# Patient Record
Sex: MALE | Race: BLACK OR AFRICAN AMERICAN | Employment: FULL TIME | URBAN - METROPOLITAN AREA
[De-identification: names, ages, dates, MRNs, and addresses within clinical notes are randomized per-mention and may not be internally consistent; named-entity substitution may affect disease eponyms.]

---

## 2018-08-02 ENCOUNTER — HOSPITAL ENCOUNTER (EMERGENCY)
Facility: CLINIC | Age: 33
Discharge: HOME | End: 2018-08-02
Attending: EMERGENCY MEDICINE
Payer: COMMERCIAL

## 2018-08-02 ENCOUNTER — APPOINTMENT (OUTPATIENT)
Dept: GENERAL RADIOLOGY | Facility: CLINIC | Age: 33
End: 2018-08-02
Payer: COMMERCIAL

## 2018-08-02 VITALS
RESPIRATION RATE: 16 BRPM | TEMPERATURE: 98.4 F | SYSTOLIC BLOOD PRESSURE: 138 MMHG | OXYGEN SATURATION: 97 % | DIASTOLIC BLOOD PRESSURE: 67 MMHG | HEIGHT: 68 IN | BODY MASS INDEX: 27.28 KG/M2 | HEART RATE: 79 BPM | WEIGHT: 180 LBS

## 2018-08-02 DIAGNOSIS — S83.91XA SPRAIN OF RIGHT LOWER LEG, INITIAL ENCOUNTER: Primary | ICD-10-CM

## 2018-08-02 PROCEDURE — 73590 X-RAY EXAM OF LOWER LEG: CPT

## 2018-08-02 PROCEDURE — 6370000000 HC RX 637 (ALT 250 FOR IP): Performed by: EMERGENCY MEDICINE

## 2018-08-02 PROCEDURE — 99283 EMERGENCY DEPT VISIT LOW MDM: CPT

## 2018-08-02 RX ORDER — IBUPROFEN 600 MG/1
600 TABLET ORAL ONCE
Status: COMPLETED | OUTPATIENT
Start: 2018-08-02 | End: 2018-08-02

## 2018-08-02 RX ADMIN — IBUPROFEN 600 MG: 600 TABLET ORAL at 16:23

## 2018-08-02 ASSESSMENT — PAIN DESCRIPTION - ORIENTATION: ORIENTATION: RIGHT

## 2018-08-02 ASSESSMENT — PAIN DESCRIPTION - PAIN TYPE: TYPE: ACUTE PAIN

## 2018-08-02 ASSESSMENT — PAIN SCALES - GENERAL
PAINLEVEL_OUTOF10: 2
PAINLEVEL_OUTOF10: 5
PAINLEVEL_OUTOF10: 2

## 2018-08-02 ASSESSMENT — PAIN DESCRIPTION - DESCRIPTORS: DESCRIPTORS: ACHING

## 2018-08-02 NOTE — ED PROVIDER NOTES
injury  TECHNOLOGIST PROVIDED HISTORY:  Reason for exam:->injury  Ordering Physician Provided Reason for Exam: pivoted his leg at work and felt  pain shoot down his lower leg. Acuity: Acute  Type of Exam: Initial    FINDINGS:  Mineralization and alignment are satisfactory.  No acute fracture,  dislocation, or localized soft tissue swelling.  No radiopaque foreign body.                        EMERGENCY DEPARTMENT COURSE:   Vitals:    Vitals:    08/02/18 1551   BP: 128/70   Pulse: 75   Resp: 15   Temp: 98.4 °F (36.9 °C)   TempSrc: Oral   Weight: 81.6 kg (180 lb)   Height: 5' 8\" (1.727 m)     -------------------------  BP: 128/70, Temp: 98.4 °F (36.9 °C), Pulse: 75, Resp: 15      Re-evaluation Notes    The patient would like a splint. This was provided. He declines medicine for pain or a work note. He is advised to ice and elevate. I see no evidence of acute fracture or compartment syndrome. He may follow-up with his own doctor. He is discharged in good condition. PROCEDURES:    An air cast was applied by the nurse. The patient good color, sensation, motion of the toes after placement. FINAL IMPRESSION      1.  Sprain of right lower leg, initial encounter          DISPOSITION/PLAN   DISPOSITION Discharge - Pending Orders Complete 08/02/2018 04:34:09 PM      Condition on Disposition    good    PATIENT REFERRED TO:  your doctor    In 1 week        DISCHARGE MEDICATIONS:  New Prescriptions    No medications on file       (Please note that portions of this note were completed with a voice recognition program.  Efforts were made to edit the dictations but occasionally words are mis-transcribed.)    Powers MD   Attending Emergency Physician       Tr Roman MD  08/02/18 2417

## 2019-08-11 ENCOUNTER — HOSPITAL ENCOUNTER (EMERGENCY)
Age: 34
Discharge: HOME | End: 2019-08-11
Attending: EMERGENCY MEDICINE

## 2019-08-11 VITALS
SYSTOLIC BLOOD PRESSURE: 142 MMHG | TEMPERATURE: 98.5 F | OXYGEN SATURATION: 100 % | WEIGHT: 170.3 LBS | DIASTOLIC BLOOD PRESSURE: 89 MMHG | HEART RATE: 74 BPM | BODY MASS INDEX: 25.22 KG/M2 | HEIGHT: 69 IN | RESPIRATION RATE: 16 BRPM

## 2019-08-11 DIAGNOSIS — N34.2 URETHRITIS: Primary | ICD-10-CM

## 2019-08-11 PROCEDURE — 81003 URINALYSIS AUTO W/O SCOPE: CPT

## 2019-08-11 PROCEDURE — 87591 N.GONORRHOEAE DNA AMP PROB: CPT

## 2019-08-11 PROCEDURE — 87491 CHLMYD TRACH DNA AMP PROBE: CPT

## 2019-08-11 PROCEDURE — 96372 THER/PROPH/DIAG INJ SC/IM: CPT

## 2019-08-11 PROCEDURE — 6360000002 HC RX W HCPCS: Performed by: EMERGENCY MEDICINE

## 2019-08-11 PROCEDURE — 99283 EMERGENCY DEPT VISIT LOW MDM: CPT

## 2019-08-11 PROCEDURE — 6370000000 HC RX 637 (ALT 250 FOR IP): Performed by: EMERGENCY MEDICINE

## 2019-08-11 RX ORDER — CEFTRIAXONE SODIUM 250 MG/1
250 INJECTION, POWDER, FOR SOLUTION INTRAMUSCULAR; INTRAVENOUS ONCE
Status: COMPLETED | OUTPATIENT
Start: 2019-08-11 | End: 2019-08-11

## 2019-08-11 RX ORDER — AZITHROMYCIN 250 MG/1
1000 TABLET, FILM COATED ORAL ONCE
Status: COMPLETED | OUTPATIENT
Start: 2019-08-11 | End: 2019-08-11

## 2019-08-11 RX ADMIN — AZITHROMYCIN MONOHYDRATE 1000 MG: 250 TABLET ORAL at 11:20

## 2019-08-11 RX ADMIN — CEFTRIAXONE SODIUM 250 MG: 250 INJECTION, POWDER, FOR SOLUTION INTRAMUSCULAR; INTRAVENOUS at 11:20

## 2019-08-11 ASSESSMENT — ENCOUNTER SYMPTOMS
FACIAL SWELLING: 0
DIARRHEA: 0
SHORTNESS OF BREATH: 0
EYE DISCHARGE: 0
COLOR CHANGE: 0
EYE REDNESS: 0
COUGH: 0
CONSTIPATION: 0
VOMITING: 0
ABDOMINAL PAIN: 0

## 2019-08-11 NOTE — ED PROVIDER NOTES
and vomiting. Genitourinary: Positive for discharge. Negative for dysuria, genital sores and hematuria. Musculoskeletal: Negative for arthralgias. Skin: Negative for color change and rash. Neurological: Negative for syncope, numbness and headaches. Hematological: Negative for adenopathy. Psychiatric/Behavioral: Negative for confusion. The patient is not nervous/anxious. Except as noted above the remainder of the review of systems was reviewed and negative. PHYSICAL EXAM    (up to 7 for level 4, 8 or more for level 5)     Vitals:    08/11/19 1058   BP: (!) 142/89   Pulse: 74   Resp: 16   Temp: 98.5 °F (36.9 °C)   TempSrc: Oral   SpO2: 100%   Weight: 170 lb 4.8 oz (77.2 kg)   Height: 5' 9\" (1.753 m)       Physical Exam   Constitutional: He is oriented to person, place, and time. He appears well-developed and well-nourished. No distress. HENT:   Head: Normocephalic and atraumatic. Eyes: Right eye exhibits no discharge. Left eye exhibits no discharge. No scleral icterus. Neck: Neck supple. Cardiovascular: Normal rate and regular rhythm. Pulmonary/Chest: Effort normal and breath sounds normal. No stridor. No respiratory distress. He has no wheezes. He has no rales. Abdominal: Soft. He exhibits no distension. There is no tenderness. Musculoskeletal: Normal range of motion. Lymphadenopathy:     He has no cervical adenopathy. Neurological: He is alert and oriented to person, place, and time. Skin: Skin is warm and dry. No rash noted. He is not diaphoretic. No erythema. Psychiatric: He has a normal mood and affect. His behavior is normal.   Vitals reviewed.           DIAGNOSTIC RESULTS     EKG: All EKG's are interpreted by the Emergency Department Physician who either signs or Co-signs this chart in the absence of a cardiologist.    Not indicated    RADIOLOGY:   Non-plain film images such as CT, Ultrasound and MRI are read by the radiologist. Plain radiographic images are visualized and preliminarily interpreted by the emergency physician with the below findings:    Not indicated    Interpretation per the Radiologist below, if available at the time of this note:        LABS:  Labs Reviewed   C.TRACHOMATIS N.GONORRHOEAE DNA, URINE       All other labs were within normal range or not returned as of this dictation. EMERGENCY DEPARTMENT COURSE and DIFFERENTIAL DIAGNOSIS/MDM:   Vitals:    Vitals:    08/11/19 1058   BP: (!) 142/89   Pulse: 74   Resp: 16   Temp: 98.5 °F (36.9 °C)   TempSrc: Oral   SpO2: 100%   Weight: 170 lb 4.8 oz (77.2 kg)   Height: 5' 9\" (1.753 m)       Orders Placed This Encounter   Medications    cefTRIAXone (ROCEPHIN) injection 250 mg    azithromycin (ZITHROMAX) tablet 1,000 mg       Medical Decision Making: My clinical impression is that he has urethritis. He was treated with Rocephin and Zithromax. Treatment diagnosis and follow-up were discussed with the patient. CONSULTS:  None    PROCEDURES:  None    FINAL IMPRESSION      1.  Urethritis          DISPOSITION/PLAN   DISPOSITION Decision To Discharge 08/11/2019 11:11:09 AM      PATIENT REFERRED TO:   [facility] ED  [address] [facility]  [phone]    If symptoms worsen      DISCHARGE MEDICATIONS:     New Prescriptions    No medications on file         (Please note that portions of this note were completed with a voice recognition program.  Efforts were made to edit the dictations but occasionally words are mis-transcribed.)    Kam Gutierrez MD  Attending Emergency Physician           Kam Gutierrez MD  08/11/19 8856

## 2019-08-12 LAB
C. TRACHOMATIS DNA ,URINE: NEGATIVE
N. GONORRHOEAE DNA, URINE: ABNORMAL
SPECIMEN DESCRIPTION: ABNORMAL

## 2020-07-28 ENCOUNTER — APPOINTMENT (OUTPATIENT)
Dept: GENERAL RADIOLOGY | Age: 35
End: 2020-07-28
Payer: COMMERCIAL

## 2020-07-28 ENCOUNTER — HOSPITAL ENCOUNTER (EMERGENCY)
Age: 35
Discharge: HOME | End: 2020-07-28
Attending: EMERGENCY MEDICINE
Payer: COMMERCIAL

## 2020-07-28 VITALS
SYSTOLIC BLOOD PRESSURE: 147 MMHG | HEART RATE: 70 BPM | OXYGEN SATURATION: 100 % | TEMPERATURE: 98.1 F | DIASTOLIC BLOOD PRESSURE: 77 MMHG | RESPIRATION RATE: 14 BRPM

## 2020-07-28 LAB
ABSOLUTE EOS #: 0.37 K/UL (ref 0–0.44)
ABSOLUTE IMMATURE GRANULOCYTE: 0.04 K/UL (ref 0–0.3)
ABSOLUTE LYMPH #: 2.13 K/UL (ref 1.1–3.7)
ABSOLUTE MONO #: 0.59 K/UL (ref 0.1–1.2)
ANION GAP SERPL CALCULATED.3IONS-SCNC: 11 MMOL/L (ref 9–17)
BASOPHILS # BLD: 0 % (ref 0–2)
BASOPHILS ABSOLUTE: 0.03 K/UL (ref 0–0.2)
BUN BLDV-MCNC: 17 MG/DL (ref 6–20)
BUN/CREAT BLD: 20 (ref 9–20)
CALCIUM SERPL-MCNC: 10.6 MG/DL (ref 8.6–10.4)
CHLORIDE BLD-SCNC: 102 MMOL/L (ref 98–107)
CO2: 26 MMOL/L (ref 20–31)
CREAT SERPL-MCNC: 0.83 MG/DL (ref 0.7–1.2)
DIFFERENTIAL TYPE: ABNORMAL
EOSINOPHILS RELATIVE PERCENT: 5 % (ref 1–4)
GFR AFRICAN AMERICAN: >60 ML/MIN
GFR NON-AFRICAN AMERICAN: >60 ML/MIN
GFR SERPL CREATININE-BSD FRML MDRD: ABNORMAL ML/MIN/{1.73_M2}
GFR SERPL CREATININE-BSD FRML MDRD: ABNORMAL ML/MIN/{1.73_M2}
GLUCOSE BLD-MCNC: 97 MG/DL (ref 70–99)
HCT VFR BLD CALC: 42.1 % (ref 40.7–50.3)
HEMOGLOBIN: 13.7 G/DL (ref 13–17)
IMMATURE GRANULOCYTES: 1 %
LYMPHOCYTES # BLD: 29 % (ref 24–43)
MCH RBC QN AUTO: 31.4 PG (ref 25.2–33.5)
MCHC RBC AUTO-ENTMCNC: 32.5 G/DL (ref 28.4–34.8)
MCV RBC AUTO: 96.3 FL (ref 82.6–102.9)
MONOCYTES # BLD: 8 % (ref 3–12)
MYOGLOBIN: 27 NG/ML (ref 28–72)
MYOGLOBIN: 31 NG/ML (ref 28–72)
NRBC AUTOMATED: 0 PER 100 WBC
PDW BLD-RTO: 13.3 % (ref 11.8–14.4)
PLATELET # BLD: 231 K/UL (ref 138–453)
PLATELET ESTIMATE: ABNORMAL
PMV BLD AUTO: 10.4 FL (ref 8.1–13.5)
POTASSIUM SERPL-SCNC: 3.9 MMOL/L (ref 3.7–5.3)
RBC # BLD: 4.37 M/UL (ref 4.21–5.77)
RBC # BLD: ABNORMAL 10*6/UL
SEG NEUTROPHILS: 57 % (ref 36–65)
SEGMENTED NEUTROPHILS ABSOLUTE COUNT: 4.26 K/UL (ref 1.5–8.1)
SODIUM BLD-SCNC: 139 MMOL/L (ref 135–144)
TROPONIN INTERP: ABNORMAL
TROPONIN INTERP: NORMAL
TROPONIN T: ABNORMAL NG/ML
TROPONIN T: NORMAL NG/ML
TROPONIN, HIGH SENSITIVITY: <6 NG/L (ref 0–22)
TROPONIN, HIGH SENSITIVITY: <6 NG/L (ref 0–22)
WBC # BLD: 7.4 K/UL (ref 3.5–11.3)
WBC # BLD: ABNORMAL 10*3/UL

## 2020-07-28 PROCEDURE — 83874 ASSAY OF MYOGLOBIN: CPT

## 2020-07-28 PROCEDURE — 85025 COMPLETE CBC W/AUTO DIFF WBC: CPT

## 2020-07-28 PROCEDURE — 84484 ASSAY OF TROPONIN QUANT: CPT

## 2020-07-28 PROCEDURE — 6360000002 HC RX W HCPCS: Performed by: NURSE PRACTITIONER

## 2020-07-28 PROCEDURE — 93005 ELECTROCARDIOGRAM TRACING: CPT | Performed by: NURSE PRACTITIONER

## 2020-07-28 PROCEDURE — 73030 X-RAY EXAM OF SHOULDER: CPT

## 2020-07-28 PROCEDURE — 6370000000 HC RX 637 (ALT 250 FOR IP): Performed by: NURSE PRACTITIONER

## 2020-07-28 PROCEDURE — 80048 BASIC METABOLIC PNL TOTAL CA: CPT

## 2020-07-28 PROCEDURE — 99285 EMERGENCY DEPT VISIT HI MDM: CPT

## 2020-07-28 PROCEDURE — 96372 THER/PROPH/DIAG INJ SC/IM: CPT

## 2020-07-28 PROCEDURE — 71045 X-RAY EXAM CHEST 1 VIEW: CPT

## 2020-07-28 RX ORDER — KETOROLAC TROMETHAMINE 30 MG/ML
30 INJECTION, SOLUTION INTRAMUSCULAR; INTRAVENOUS ONCE
Status: COMPLETED | OUTPATIENT
Start: 2020-07-28 | End: 2020-07-28

## 2020-07-28 RX ORDER — ASPIRIN 81 MG/1
324 TABLET, CHEWABLE ORAL ONCE
Status: COMPLETED | OUTPATIENT
Start: 2020-07-28 | End: 2020-07-28

## 2020-07-28 RX ORDER — IBUPROFEN 800 MG/1
800 TABLET ORAL EVERY 8 HOURS PRN
Qty: 30 TABLET | Refills: 0 | Status: SHIPPED | OUTPATIENT
Start: 2020-07-28 | End: 2022-01-14

## 2020-07-28 RX ADMIN — ASPIRIN 81 MG 324 MG: 81 TABLET ORAL at 18:14

## 2020-07-28 RX ADMIN — KETOROLAC TROMETHAMINE 30 MG: 30 INJECTION, SOLUTION INTRAMUSCULAR at 18:01

## 2020-07-28 ASSESSMENT — PAIN DESCRIPTION - PAIN TYPE: TYPE: ACUTE PAIN

## 2020-07-28 ASSESSMENT — ENCOUNTER SYMPTOMS
VOMITING: 0
COLOR CHANGE: 0
CHEST TIGHTNESS: 0
COUGH: 0
BACK PAIN: 0
NAUSEA: 0
SINUS PRESSURE: 0
SHORTNESS OF BREATH: 0

## 2020-07-28 ASSESSMENT — PAIN DESCRIPTION - ONSET: ONSET: ON-GOING

## 2020-07-28 ASSESSMENT — PAIN DESCRIPTION - DESCRIPTORS: DESCRIPTORS: SHARP

## 2020-07-28 ASSESSMENT — PAIN DESCRIPTION - PROGRESSION: CLINICAL_PROGRESSION: RAPIDLY IMPROVING

## 2020-07-28 ASSESSMENT — PAIN DESCRIPTION - LOCATION: LOCATION: CHEST;SHOULDER

## 2020-07-28 ASSESSMENT — PAIN DESCRIPTION - FREQUENCY: FREQUENCY: INTERMITTENT

## 2020-07-28 ASSESSMENT — PAIN SCALES - GENERAL
PAINLEVEL_OUTOF10: 6
PAINLEVEL_OUTOF10: 1

## 2020-07-28 ASSESSMENT — PAIN DESCRIPTION - ORIENTATION: ORIENTATION: LEFT

## 2020-07-28 NOTE — ED PROVIDER NOTES
[address] [place] [address] ED  EMERGENCY DEPARTMENT ENCOUNTER      Pt Name: Nirali Pearson  MRN: [id number]  [place] 1985  Date of evaluation: 7/28/20  CHIEF COMPLAINT       Chief Complaint   Patient presents with    Chest Pain    Shoulder Pain     Left     HISTORY OF PRESENT ILLNESS   Presents to the emergency room via private auto with 5 month history of left shoulder pain and 1 week history of chest pain. Pain has been constant and is slightly worse with movement. No injury or trauma. No associated dizziness, diaphoresis or shortness of breath. He his a smoker. No history of CAD or clotting disorder. No history of CAD in his immediate family. Motrin has been ineffective. The history is provided by the patient. REVIEW OF SYSTEMS     Review of Systems   Constitutional: Negative for activity change, diaphoresis and fever. HENT: Negative for congestion and sinus pressure. Respiratory: Negative for cough, chest tightness and shortness of breath. Cardiovascular: Positive for chest pain. Negative for palpitations and leg swelling. Gastrointestinal: Negative for nausea and vomiting. Musculoskeletal: Positive for arthralgias. Negative for back pain and myalgias. Skin: Negative for color change and rash. Neurological: Negative for dizziness and headaches. Psychiatric/Behavioral: Negative for confusion and decreased concentration. PASTMEDICAL HISTORY   History reviewed. No pertinent past medical history. SURGICAL HISTORY     History reviewed. No pertinent surgical history. CURRENT MEDICATIONS       Discharge Medication List as of 7/28/2020  8:41 PM        ALLERGIES     has No Known Allergies. FAMILY HISTORY     has no family status information on file.       SOCIAL HISTORY       Social History     Tobacco Use    Smoking status: Current Every Day Smoker     Types: Cigarettes    Smokeless tobacco: Never Used   Substance Use Topics    Alcohol use: Yes     Comment: socially    Drug use: Never PHYSICAL EXAM     INITIAL VITALS: BP (!) 147/77   Pulse 70   Temp 98.1 °F (36.7 °C)   Resp 14   SpO2 100%    Physical Exam  Constitutional:       Appearance: Normal appearance. HENT:      Right Ear: External ear normal.      Left Ear: External ear normal.      Mouth/Throat:      Mouth: Mucous membranes are moist.      Pharynx: Oropharynx is clear. Eyes:      Extraocular Movements: Extraocular movements intact. Conjunctiva/sclera: Conjunctivae normal.      Pupils: Pupils are equal, round, and reactive to light. Cardiovascular:      Rate and Rhythm: Normal rate and regular rhythm. Pulmonary:      Effort: Pulmonary effort is normal.      Breath sounds: Normal breath sounds. Abdominal:      Palpations: Abdomen is soft. Tenderness: There is no abdominal tenderness. Musculoskeletal: Normal range of motion. General: No tenderness or deformity. Skin:     General: Skin is warm and dry. Findings: No bruising or erythema. Neurological:      General: No focal deficit present. Mental Status: He is alert and oriented to person, place, and time. Psychiatric:         Mood and Affect: Mood normal.         Thought Content: Thought content normal.         DIAGNOSTIC RESULTS     RADIOLOGY:All plain film, CT, MRI, and formal ultrasound images (except ED bedside ultrasound) are read by the radiologist, see reports below, unless otherwise noted in MDM or here. Interpretation per the Radiologist below, if available at the time of this note:    XR CHEST PORTABLE   Final Result   Low lung volumes with minimal basilar atelectasis. XR SHOULDER LEFT (MIN 2 VIEWS)   Preliminary Result   No evidence of acute osseous abnormality involving the left shoulder.              LABS:  Labs Reviewed   CBC WITH AUTO DIFFERENTIAL - Abnormal; Notable for the following components:       Result Value    Eosinophils % 5 (*)     Immature Granulocytes 1 (*)     All other components within normal limits   BASIC METABOLIC PANEL - Abnormal; Notable for the following components:    Calcium 10.6 (*)     All other components within normal limits   TROP/MYOGLOBIN - Abnormal; Notable for the following components:    Myoglobin 27 (*)     All other components within normal limits   TROP/MYOGLOBIN       All other labs were within normal range or not returned as of this dictation. EMERGENCY DEPARTMENT COURSE and DIFFERENTIAL DIAGNOSIS/MDM:   Vitals:    Vitals:    20 1708   BP: (!) 147/77   Pulse: 70   Resp: 14   Temp: 98.1 °F (36.7 °C)   SpO2: 100%       Medical Decision Makinyear-old with chest pain. EKG was interpreted by Dr. Zach Ellsworth. Trop is negative times 1. Care was turned over to Dr. Zach Ellsworth at the end of my shift for further evaluation, orders, diagnosis and disposition. ED Course as of 1048   Tue 2020   1941 Resting comfortably. No complaints. Second trop due at 8pm    [EL]      ED Course User Index  [EL] ZURDO Vaughan - CNP       CONSULTS:  IP CONSULT TO CARDIOLOGY    PROCEDURES:  None    The patient was given the following meds in the ED:  Orders Placed This Encounter   Medications    ketorolac (TORADOL) injection 30 mg    aspirin chewable tablet 324 mg    ibuprofen (ADVIL;MOTRIN) 800 MG tablet     Sig: Take 1 tablet by mouth every 8 hours as needed for Pain     Dispense:  30 tablet     Refill:  0       FINAL IMPRESSION      1. Chest pain, unspecified type    2.  Acute pain of left shoulder          DISPOSITION/PLAN   DISPOSITION  Discharged      PATIENT REFERRED TO:   [facility] ED  [address] [facility]  [phone]    If symptoms worsen    [facility]  [phone]  Schedule an appointment as soon as possible for a visit         DISCHARGE MEDICATIONS:     Discharge Medication List as of 2020  8:41 PM          CRITICAL CARE TIME       Please note that portions of this note were completed with a voice recognition program.      ZURDO Robin - SESAR Day, ZURDO - CNP  07/28/20 1944       Velma Day, ZURDO - CNP  07/30/20 1047

## 2020-07-28 NOTE — ED NOTES
Pt ambulatory to room 6 with c/o ongoing left shoulder pain x 2-3 months. Pt denies any injuries. Pt reports he has not seen his PCP for this c/o, states \"I didn't want to come in with all the COVID stuff going on\". No obvious deformities, ecchymosis, or edema noted to left shoulder. Full ROM intact. Radial pulse WNL. Pt also c/o left sided CP x 1 week. Pt reports taking PO Antacid with mild relief. Pt reports he was just sitting down when CP began. Pt reports pain is aggravated by movement but no alleviating factors. Pt denies any SOB, cough, dizziness, N/V, or sweats/chills/fevers. Respirations even and non labored. NAD noted.       Keila Singh RN  07/28/20 2950

## 2020-07-28 NOTE — ED PROVIDER NOTES
[address]     Pt Name: Angelica Phillip  MRN: [id number]  [place] 1985  Date of evaluation: 7/28/20   Angelica Phillip is a 27 y.o. male with CC: Chest Pain and Shoulder Pain (Left)    MDM:   Patient is a 76-year-old male here with left-sided chest pain described as sharp and left shoulder pain. He is had the shoulder pain for several weeks, the chest pain for few days. States intermittent, no associated shortness of breath diaphoresis nausea vomiting. Denies fevers or chills. Denies history of PE DVT recent surgeries leg swelling hemoptysis or prolonged immobilization. Denies crack cocaine or heroin use. Denies personal cardiac history or any syncope or lightheadedness dizziness. On exam resting comfortably in bed, appears well no distress neck is supple heart sounds regular lungs clear equal radial pulses abdomen soft nontender lower extremities no calf tenderness or asymmetry. Impression is chest pain, will check a troponin, patient is PE RC negative doubt PE, EKG and chest x-ray as well. 6:10 PM  EKG interpreted as STEMI by computer, patient appears very well, likely benign early repolarization although EKG has been emailed to the on-call cardiologist for recommendations. Spoke with Dr. Fritz Cronin who thinks this is likely benign early repolarization, if 2- troponins can be discharged with outpatient follow-up. 8:18 PM EDT  Repeat EKG  EKG Interpretation    Interpreted by me    Rhythm: normal sinus   Rate: normal  Axis: normal  Ectopy: none  Conduction: Incomplete right bundle branch block  ST Segments: Mild elevation in lead I, aVL, V2 through V6  T Waves: no acute change  Q Waves: none    Clinical Impression: Abnormal EKG, appears similar to prior no reciprocal changes to suggest ischemia    ED Course as of Jul 28 2019   Tue Jul 28, 2020   1941 Resting comfortably. No complaints.  Second trop due at 8pm    [EL]      ED Course User Index  [EL] Angelika Mejia KIMBERLY Ervin, APRN - SESAR       CRITICAL CARE:       EKG: All EKG's are interpreted by the Emergency Department Physician who either signs or Co-signs this chart in the absence of a cardiologist.    EKG Interpretation    Interpreted by me    Rhythm: normal sinus   Rate: normal  Axis: normal  Ectopy: none  Conduction: Incomplete right bundle branch block   ST Segments: Elevation seen in lead I, aVL, V2, V3, V4, V5 V6  T Waves: no acute change  Q Waves: none    Clinical Impression: Abnormal EKG, elevation seen in several leads, do not see any reciprocal changes, consider ischemia, none prior for comparison, consider benign early repolarization or pericarditis      RADIOLOGY:All plain film, CT, MRI, and formal ultrasound images (except ED bedside ultrasound) are read by the radiologist, see reports below, unless otherwise noted in MDM or here. XR CHEST PORTABLE   Final Result   Low lung volumes with minimal basilar atelectasis. XR SHOULDER LEFT (MIN 2 VIEWS)   Preliminary Result   No evidence of acute osseous abnormality involving the left shoulder. LABS: All lab results were reviewed by myself, and all abnormals are listed below. Labs Reviewed   CBC WITH AUTO DIFFERENTIAL - Abnormal; Notable for the following components:       Result Value    Eosinophils % 5 (*)     Immature Granulocytes 1 (*)     All other components within normal limits   BASIC METABOLIC PANEL - Abnormal; Notable for the following components:    Calcium 10.6 (*)     All other components within normal limits   TROP/MYOGLOBIN - Abnormal; Notable for the following components:    Myoglobin 27 (*)     All other components within normal limits   TROP/MYOGLOBIN     CONSULTS:  IP CONSULT TO CARDIOLOGY  FINAL IMPRESSION      1. Chest pain, unspecified type            PASTMEDICAL HISTORY   History reviewed. No pertinent past medical history. SURGICAL HISTORY     History reviewed. No pertinent surgical history.   CURRENT MEDICATIONS Previous Medications    No medications on file     ALLERGIES     has No Known Allergies. FAMILY HISTORY     has no family status information on file. SOCIAL HISTORY       Social History     Tobacco Use    Smoking status: Current Every Day Smoker     Types: Cigarettes    Smokeless tobacco: Never Used   Substance Use Topics    Alcohol use: Yes     Comment: socially    Drug use: Never       I personally evaluated and examined the patient in conjunction with the APC and agree with the assessment, treatment plan, and disposition of the patient as recorded by the APC.    Brion Kayser, MD  Attending Emergency Physician          Brion Kayser, MD  07/28/20 3343

## 2020-07-29 LAB
EKG ATRIAL RATE: 63 BPM
EKG ATRIAL RATE: 66 BPM
EKG P AXIS: -5 DEGREES
EKG P AXIS: 66 DEGREES
EKG P-R INTERVAL: 170 MS
EKG P-R INTERVAL: 182 MS
EKG Q-T INTERVAL: 368 MS
EKG Q-T INTERVAL: 372 MS
EKG QRS DURATION: 102 MS
EKG QRS DURATION: 108 MS
EKG QTC CALCULATION (BAZETT): 376 MS
EKG QTC CALCULATION (BAZETT): 389 MS
EKG R AXIS: 19 DEGREES
EKG R AXIS: 23 DEGREES
EKG T AXIS: 30 DEGREES
EKG T AXIS: 41 DEGREES
EKG VENTRICULAR RATE: 63 BPM
EKG VENTRICULAR RATE: 66 BPM

## 2022-01-14 ENCOUNTER — APPOINTMENT (OUTPATIENT)
Dept: GENERAL RADIOLOGY | Facility: CLINIC | Age: 37
End: 2022-01-14
Payer: COMMERCIAL

## 2022-01-14 ENCOUNTER — HOSPITAL ENCOUNTER (EMERGENCY)
Facility: CLINIC | Age: 37
Discharge: HOME | End: 2022-01-14
Attending: EMERGENCY MEDICINE
Payer: COMMERCIAL

## 2022-01-14 VITALS
WEIGHT: 193 LBS | RESPIRATION RATE: 17 BRPM | OXYGEN SATURATION: 98 % | HEIGHT: 68 IN | TEMPERATURE: 98.1 F | BODY MASS INDEX: 29.25 KG/M2 | SYSTOLIC BLOOD PRESSURE: 124 MMHG | DIASTOLIC BLOOD PRESSURE: 64 MMHG | HEART RATE: 82 BPM

## 2022-01-14 DIAGNOSIS — M79.604 RIGHT LEG PAIN: Primary | ICD-10-CM

## 2022-01-14 PROCEDURE — 73590 X-RAY EXAM OF LOWER LEG: CPT

## 2022-01-14 PROCEDURE — 99283 EMERGENCY DEPT VISIT LOW MDM: CPT

## 2022-01-14 RX ORDER — IBUPROFEN 800 MG/1
800 TABLET ORAL 3 TIMES DAILY PRN
Qty: 30 TABLET | Refills: 0 | Status: SHIPPED | OUTPATIENT
Start: 2022-01-14

## 2022-01-14 NOTE — ED PROVIDER NOTES
[facility] ED  [address] [facility]  Phone: [phone]      Attending Physician [address]       Chief Complaint   Patient presents with    Leg Pain     right leg ,calf pain       DIAGNOSTIC RESULTS     LABS:  Labs Reviewed - No data to display    All other labs were within normal range or not returned as of this dictation. RADIOLOGY:  XR TIBIA FIBULA RIGHT (2 VIEWS)   Final Result   No acute abnormality right tibia or fibula. EMERGENCY DEPARTMENT COURSE:   Vitals:    Vitals:    01/14/22 1035 01/14/22 1036 01/14/22 1037   BP:  124/64    Pulse:  82    Resp:   17   Temp:   98.1 °F (36.7 °C)   SpO2:   98%   Weight: 87.5 kg (193 lb)     Height: 5' 8\" (1.727 m)       -------------------------  BP: 124/64, Temp: 98.1 °F (36.7 °C), Pulse: 82, Resp: 17             PERTINENT ATTENDING PHYSICIAN COMMENTS:    I performed a history and physical examination of the patient and discussed management with the mid level provider. I reviewed the mid level provider's note and agree with the documented findings and plan of care. Any areas of disagreement are noted on the chart. I was personally present for the key portions of any procedures. I have documented in the chart those procedures where I was not present during the key portions. I have reviewed the emergency nurses triage note. I agree with the chief complaint, past medical history, past surgical history, allergies, medications, social and family history as documented unless otherwise noted below. Documentation of the HPI, Physical Exam and Medical Decision Making performed by mid level providers is based on my personal performance of the HPI, PE and MDM. For Physician Assistant/ Nurse Practitioner cases/documentation I have personally evaluated this patient and have completed at least one if not all key elements of the E/M (history, physical exam, and MDM).  Additional findings are as

## 2022-01-14 NOTE — ED PROVIDER NOTES
[facility]  [address] [facility]  Phone: [phone]      eMERGENCY dEPARTMENT eNCOUnter      Pt Name: Dorise Holter  MRN: [id number]  [place] 1985  Date of evaluation: 1/14/22      CHIEF COMPLAINT:  Chief Complaint   Patient presents with    Leg Pain     right leg ,calf pain     HISTORY OF PRESENT ILLNESS    Dorise Holter is a 39 y.o. male who presents with evaluation for right lower leg pain. He states he was walking down the stairs at work 2 days ago when he felt a sharp pain in the right side of his lower leg. He states he continues to feel the pain and has had a previous \"sprain\" to the same area in the past that he was evaluated for here at this facility in 2018. He denies radiation of pain to his ankle, knee, hip or foot. He has no paresthesias or focal weakness. He describes the pain as a sharp pain when walking or weightbearing and relieved with rest.  He states he has been taking acetaminophen for pain with minimal relief. Nursing Notes were reviewed. REVIEW OF SYSTEMS       Constitutional: Denies recent fever, chills. Eyes: No vision changes. Neck: No neck pain. Respiratory: Denies recent shortness of breath. Cardiac:  Denies recent chest pain. GI:  Denies abdominal pain/nausea/vomiting/diarrhea. : Denies dysuria. Musculoskeletal: Complaining of right lower leg pain  Neurologic:  No headache. No focal weakness. No paresthesias. Skin:  Denies any rash. Negative in 10 essential Systems except as mentioned above and in the HPI. PAST MEDICAL HISTORY   PMH:  has no past medical history on file. Surgical History:  has no past surgical history on file. Social History:  reports that he has been smoking cigarettes. He has never used smokeless tobacco. He reports current alcohol use. He reports that he does not use drugs. Family History: None  Psychiatric History: None    Allergies:has No Known Allergies.       PHYSICAL EXAM     INITIAL VITALS: /64   Pulse 82   Temp 98.1 °F (36.7 °C)   Resp 17   Ht 5' 8\" (1.727 m)   Wt 87.5 kg (193 lb)   SpO2 98%   BMI 29.35 kg/m²   Constitutional:  Well developed   Eyes:  Pupils equal/round  HENT:  Atraumatic, external ears normal, nose normal  Respiratory:  LCTA bilat, no W/R/R  Cardiovascular:  RRR with normal S1 and S2    Musculoskeletal:  Lateral TTP to calf without ecchymosis, swelling or erythema, DP/PT pulses intact, NV intact distally. No signs of acute limb ischemia. Back:  No midline or CVA tenderness. Normal to inspection. Integument:   No rash. Neurologic:  Alert & appropriate mentation/interaction, no focal deficits noted       DIAGNOSTIC RESULTS     EKG: All EKG's are interpreted by the Emergency Department Physician who either signs or Co-signs this chart in the absence of a cardiologist.  Not indicated    RADIOLOGY:   Reviewed the radiologist:  XR TIBIA FIBULA RIGHT (2 VIEWS)   Final Result   No acute abnormality right tibia or fibula. XR TIBIA FIBULA RIGHT (2 VIEWS)    Result Date: 1/14/2022  EXAMINATION: 2 XRAY VIEWS OF THE RIGHT TIBIA AND FIBULA 1/14/2022 10:41 am COMPARISON: Previous two-view study of the right tibia and fibula from 08/02/2018. HISTORY: ORDERING SYSTEM PROVIDED HISTORY: right leg pain TECHNOLOGIST PROVIDED HISTORY: right leg pain Reason for Exam: Right calf pain s/p coming down stairs yesterday FINDINGS: No fracture. No dislocation. Ossification or calcification distal tibiofibular syndesmosis. Ankle mortise and knee joint maintained. No marked degenerative findings. No destructive or blastic lesion. No obvious soft tissue abnormality. No sizable joint effusion suspected. No acute abnormality right tibia or fibula. LABS:  Labs Reviewed - No data to display  Not indicated    EMERGENCY DEPARTMENT COURSE / MDM:     Plan is to obtain an x-ray of the right tibia/fib to rule out acute osseous process.   He admits that he is a daily smoker although denies any recent long car rides/bus rides/flights, he does not have any blood clotting disorders, and he denies any specific injury. My suspicion for DVT is very low. X-ray tibia fibula right indicates No fracture or dislocation. Ossification or calcification distal tibiofibular syndesmosis.  Ankle mortise and knee joint maintained.  No marked degenerative findings.  No destructive or blastic lesion. No obvious soft tissue abnormality.  No sizable joint effusion suspected. Will provide patient with crutches recommend follow-up with family physician or orthopedics, prescribed ibuprofen and provide work note as patient requested. The patient presented with right leg pain. A fracture was not detected on X-ray. The knee joint was not affected and the foot and ankle are stable on exam. No skin lesions were seen. There are no signs of compartment syndrome. The pulses are 2/4. The motor is 5/5. The sensation is intact. The patient was instructed to follow up in 2-3 days with their family doctor or orthopedics. We also discussed returning to the Emergency Department immediately if new or worsening symptoms occur. We have discussed the symptoms which are most concerning (e.g., increasing pain, numbness, weakness, color change or coldness to the extremity) that necessitate immediate return. The patient understands that at this time there is no evidence for a more malignant underlying process, but the patient also understands that early in the process of an illness or injury, an emergency department workup can be falsely reassuring. Routine discharge counseling was given, and the patient understands that worsening, changing or persistent symptoms should prompt an immediate call or follow up with their primary physician or return to the emergency department. The importance of appropriate follow up was also discussed.   I have reviewed the disposition diagnosis with the patient and or their family/guardian. I have answered their questions and given discharge instructions. They voiced understanding of these instructions and did not have any further questions or complaints. Orders Placed This Encounter   Medications    ibuprofen (ADVIL;MOTRIN) 800 MG tablet     Sig: Take 1 tablet by mouth 3 times daily as needed for Pain     Dispense:  30 tablet     Refill:  0       CONSULTS:  None      FINAL IMPRESSION      1.  Right leg pain          DISPOSITION/PLAN:  DISPOSITION          PATIENT REFERRED TO:  [facility] ED  [address] [place] [address]  [phone]  Call       [address]  [address] [place] [id number]  Call in 3 days        DISCHARGE MEDICATIONS:  New Prescriptions    IBUPROFEN (ADVIL;MOTRIN) 800 MG TABLET    Take 1 tablet by mouth 3 times daily as needed for Pain       (Please note that portions of this note were completed with a voice recognition program.  Efforts were made to edit the dictations but occasionally words are mis-transcribed.)    ZURDO Houston CNP, APRN - CNP  01/14/22 1128

## 2022-01-14 NOTE — Clinical Note
Mariela Alcantara was seen and treated in our emergency department on 1/14/2022. He may return to work on 01/17/2022. Allow to use crutches while at work for comfort      If you have any questions or concerns, please don't hesitate to call.       Angel Leigh, APRN - CNP

## 2023-10-16 ENCOUNTER — HOSPITAL ENCOUNTER (EMERGENCY)
Age: 38
Discharge: HOME | End: 2023-10-16
Attending: EMERGENCY MEDICINE
Payer: COMMERCIAL

## 2023-10-16 ENCOUNTER — APPOINTMENT (OUTPATIENT)
Dept: GENERAL RADIOLOGY | Age: 38
End: 2023-10-16
Payer: COMMERCIAL

## 2023-10-16 VITALS
BODY MASS INDEX: 29.1 KG/M2 | HEIGHT: 68 IN | OXYGEN SATURATION: 97 % | DIASTOLIC BLOOD PRESSURE: 100 MMHG | SYSTOLIC BLOOD PRESSURE: 136 MMHG | TEMPERATURE: 98.1 F | WEIGHT: 192 LBS | RESPIRATION RATE: 18 BRPM | HEART RATE: 70 BPM

## 2023-10-16 DIAGNOSIS — S93.602A SPRAIN OF LEFT FOOT, INITIAL ENCOUNTER: Primary | ICD-10-CM

## 2023-10-16 PROCEDURE — 99283 EMERGENCY DEPT VISIT LOW MDM: CPT

## 2023-10-16 PROCEDURE — 73630 X-RAY EXAM OF FOOT: CPT

## 2023-10-16 RX ORDER — IBUPROFEN 600 MG/1
600 TABLET ORAL EVERY 6 HOURS PRN
Qty: 30 TABLET | Refills: 0 | Status: SHIPPED | OUTPATIENT
Start: 2023-10-16

## 2023-10-16 ASSESSMENT — PAIN DESCRIPTION - LOCATION: LOCATION: FOOT

## 2023-10-16 ASSESSMENT — PAIN DESCRIPTION - ORIENTATION: ORIENTATION: LEFT

## 2023-10-16 ASSESSMENT — ENCOUNTER SYMPTOMS: COLOR CHANGE: 1

## 2023-10-16 ASSESSMENT — PAIN - FUNCTIONAL ASSESSMENT: PAIN_FUNCTIONAL_ASSESSMENT: 0-10

## 2023-10-16 ASSESSMENT — PAIN SCALES - GENERAL: PAINLEVEL_OUTOF10: 8

## 2023-10-16 NOTE — ED PROVIDER NOTES
[address] [facility] ED  eMERGENCY dEPARTMENT eNCOUnter      Pt Name: Olya Jean Baptiste  MRN: [id number]  [address] [facility] [place] 1985  Date of evaluation: 10/16/2023  Provider: Rosanne Hilario, [facility]       Chief Complaint   Patient presents with    Foot Injury     LEFT FOOT INJURY  PLAYING BASKETBALL         HISTORY OF PRESENT ILLNESS  (Location/Symptom, Timing/Onset, Context/Setting, Quality, Duration, Modifying Factors, Severity.)   Olya Jean Baptiste is a 43 y.o. male who presents to the emergency department for evaluation of left foot pain that occurred on Friday when he was playing basketball. Patient states he stepped on a person's foot twisting his left foot. Pain 10 when he walks. Denies other injury. No ankle pain. Nursing Notes were reviewed. ALLERGIES     Patient has no known allergies. CURRENT MEDICATIONS       Previous Medications    No medications on file       PAST MEDICAL HISTORY   History reviewed. No pertinent past medical history. SURGICAL HISTORY     History reviewed. No pertinent surgical history. FAMILY HISTORY     History reviewed. No pertinent family history. No family status information on file. SOCIAL HISTORY      reports that he has been smoking cigarettes. He has never used smokeless tobacco. He reports current alcohol use. He reports that he does not use drugs. REVIEW OF SYSTEMS    (2-9 systems for level 4, 10 or more for level 5)   Review of Systems   Constitutional:  Positive for activity change. Musculoskeletal:  Positive for arthralgias, gait problem and joint swelling. Skin:  Positive for color change. Negative for wound. All other systems reviewed and are negative. Except as noted above the remainder of the review of systems was reviewed and negative.      PHYSICAL EXAM    (up to 7 for level 4, 8 or more for level 5)     ED Triage Vitals [10/16/23 0934]   BP Temp Temp Source Pulse Respirations SpO2 Height Weight - Scale   (!)

## 2023-10-16 NOTE — DISCHARGE INSTRUCTIONS
Follow-up with podiatrist if symptoms last longer than a week. Take Motrin for pain. Keep foot elevated while at rest and apply ice to the foot as needed.

## 2023-10-16 NOTE — ED PROVIDER NOTES
eMERGENCY dEPARTMENT eNCOUnter   [address] Name: Fawn Bryson  MRN: [id number]  [address] [facility] 1985  Date of evaluation: 10/16/23     Fawn Bryson is a 43 y.o. male with CC: Foot Injury (LEFT FOOT INJURY  PLAYING BASKETBALL)      Based on the medical record the care appears appropriate. I was personally available for consultation in the Emergency Department.     Chase Mane MD  Attending Emergency Physician                  Chase Mane MD  10/16/23 09